# Patient Record
Sex: MALE
[De-identification: names, ages, dates, MRNs, and addresses within clinical notes are randomized per-mention and may not be internally consistent; named-entity substitution may affect disease eponyms.]

---

## 2022-01-10 ENCOUNTER — APPOINTMENT (OUTPATIENT)
Dept: HEART AND VASCULAR | Facility: CLINIC | Age: 67
End: 2022-01-10
Payer: MEDICARE

## 2022-01-10 VITALS — HEART RATE: 91 BPM | DIASTOLIC BLOOD PRESSURE: 86 MMHG | SYSTOLIC BLOOD PRESSURE: 130 MMHG

## 2022-01-10 PROBLEM — Z00.00 ENCOUNTER FOR PREVENTIVE HEALTH EXAMINATION: Status: ACTIVE | Noted: 2022-01-10

## 2022-01-10 PROCEDURE — 99204 OFFICE O/P NEW MOD 45 MIN: CPT

## 2022-01-10 NOTE — PHYSICAL EXAM

## 2022-01-10 NOTE — ASSESSMENT
[FreeTextEntry1] : 66-year-old man with past medical history of morbid obesity,  hypertension, hyperlipidemia, diabetes here for first evaluation with this provider\par \par BECK\par -echo\par -nuc stress test \par \par HTN\par -Well-controlled \par -Continue with telmisartan hydrochlorothiazide carefully monitor renal function (creatinine 1.7, baseline?)\par -echocardiogram\par \par HLD\par LDL 60 on 12/13/2021\par -cont with atorvastatin 20 mg daily\par \par DIZZINESS\par -echo, carotids as above\par \par Follow-up in 2 weeks for echo, carotids and the nuclear stress test results\par \par

## 2022-01-10 NOTE — HISTORY OF PRESENT ILLNESS
[FreeTextEntry1] : 66-year-old man with past medical history of morbid obesity,  hypertension, hyperlipidemia, diabetes here for first evaluation with this provider\par Patient reports episodes of dizziness described as room spinning only when sitting\par Reports BECK after few blocks, can walk 2-3 blocks\par Denies , palpitations, syncope, presyncope, LE edema, orthopnea. \par \par \par PMH \par Hypertension\par Hyperlipidemia\par Diabetes\par Asthma\par \par \par PSH\par colon sgy\par \par ALL\par NKDA\par \par MEDS\par Telmisartan–hydrochlorothiazide (80-25) mg daily\par Atorvastatin 20 mg daily\par \par SH\par no smoke, no etoh, no drugs\par \par EKG 12/9/2021 Sinus rhythm, T wave inversions lateral leads\par \par \par Carotids duplex at 12/8/2020\par No hemodynamically significant carotid stenosis\par \par \par Echocardiogram 12/8/2020\par Normal left ventricular size and function\par Normal right ventricular size and function\par Mildly dilated ascending aorta (4.0 cm)\par \par Labs 12/13/2021\par Cholesterol 128\par HDL 49\par Triglycerides 96\par LDL 60\par creatinine 1.6\par

## 2022-01-14 DIAGNOSIS — R42 DIZZINESS AND GIDDINESS: ICD-10-CM

## 2022-01-14 DIAGNOSIS — E78.5 HYPERLIPIDEMIA, UNSPECIFIED: ICD-10-CM

## 2022-01-14 DIAGNOSIS — R06.02 SHORTNESS OF BREATH: ICD-10-CM

## 2022-01-14 DIAGNOSIS — I10 ESSENTIAL (PRIMARY) HYPERTENSION: ICD-10-CM

## 2022-01-31 ENCOUNTER — APPOINTMENT (OUTPATIENT)
Dept: HEART AND VASCULAR | Facility: CLINIC | Age: 67
End: 2022-01-31
Payer: MEDICARE

## 2022-01-31 PROCEDURE — 99442: CPT

## 2023-05-01 ENCOUNTER — OFFICE (OUTPATIENT)
Dept: URBAN - METROPOLITAN AREA CLINIC 30 | Facility: CLINIC | Age: 68
Setting detail: OPHTHALMOLOGY
End: 2023-05-01
Payer: MEDICARE

## 2023-05-01 DIAGNOSIS — H40.003: ICD-10-CM

## 2023-05-01 DIAGNOSIS — E11.9: ICD-10-CM

## 2023-05-01 DIAGNOSIS — H47.233: ICD-10-CM

## 2023-05-01 PROCEDURE — 92004 COMPRE OPH EXAM NEW PT 1/>: CPT | Performed by: OPHTHALMOLOGY

## 2023-05-01 PROCEDURE — 92083 EXTENDED VISUAL FIELD XM: CPT | Performed by: OPHTHALMOLOGY

## 2023-05-01 PROCEDURE — 92250 FUNDUS PHOTOGRAPHY W/I&R: CPT | Performed by: OPHTHALMOLOGY

## 2023-05-01 ASSESSMENT — PACHYMETRY
OD_CT_CORRECTION: -5
OS_CT_CORRECTION: -4

## 2023-05-01 ASSESSMENT — REFRACTION_AUTOREFRACTION
OD_SPHERE: -0.25
OD_AXIS: 150
OS_SPHERE: -0.75
OD_CYLINDER: +0.50
OS_CYLINDER: +1.00
OS_AXIS: 5

## 2023-05-01 ASSESSMENT — REFRACTION_MANIFEST
OD_VA1: 20/30+2
OS_VA1: 20/30
OS_AXIS: 5
OD_AXIS: 150
OD_SPHERE: -0.25
OD_CYLINDER: +0.50
OS_CYLINDER: +1.00
OS_SPHERE: -0.75

## 2023-05-01 ASSESSMENT — CONFRONTATIONAL VISUAL FIELD TEST (CVF)
OD_FINDINGS: FULL
OS_FINDINGS: FULL

## 2023-05-01 ASSESSMENT — SPHEQUIV_DERIVED
OD_SPHEQUIV: 0
OS_SPHEQUIV: -0.25
OD_SPHEQUIV: 0
OS_SPHEQUIV: -0.25

## 2023-05-01 ASSESSMENT — TONOMETRY
OS_IOP_MMHG: 14
OD_IOP_MMHG: 14

## 2023-05-01 ASSESSMENT — VISUAL ACUITY
OD_BCVA: 20/50
OS_BCVA: 20/60-1

## 2023-07-13 ENCOUNTER — OFFICE (OUTPATIENT)
Dept: URBAN - METROPOLITAN AREA CLINIC 30 | Facility: CLINIC | Age: 68
Setting detail: OPHTHALMOLOGY
End: 2023-07-13
Payer: MEDICARE

## 2023-07-13 DIAGNOSIS — E11.9: ICD-10-CM

## 2023-07-13 DIAGNOSIS — H16.223: ICD-10-CM

## 2023-07-13 DIAGNOSIS — H47.233: ICD-10-CM

## 2023-07-13 DIAGNOSIS — H25.093: ICD-10-CM

## 2023-07-13 DIAGNOSIS — H40.003: ICD-10-CM

## 2023-07-13 PROCEDURE — 92014 COMPRE OPH EXAM EST PT 1/>: CPT | Performed by: OPHTHALMOLOGY

## 2023-07-13 PROCEDURE — 92133 CPTRZD OPH DX IMG PST SGM ON: CPT | Performed by: OPHTHALMOLOGY

## 2023-07-13 PROCEDURE — 76514 ECHO EXAM OF EYE THICKNESS: CPT | Performed by: OPHTHALMOLOGY

## 2023-07-13 PROCEDURE — 92020 GONIOSCOPY: CPT | Performed by: OPHTHALMOLOGY

## 2023-07-13 PROCEDURE — 92083 EXTENDED VISUAL FIELD XM: CPT | Performed by: OPHTHALMOLOGY

## 2023-07-13 ASSESSMENT — REFRACTION_AUTOREFRACTION
OD_AXIS: 150
OD_CYLINDER: +0.50
OS_CYLINDER: +1.00
OD_SPHERE: -0.25
OS_SPHERE: -0.75
OS_AXIS: 5

## 2023-07-13 ASSESSMENT — REFRACTION_MANIFEST
OD_AXIS: 150
OS_VA1: 20/30
OS_CYLINDER: +1.00
OD_VA1: 20/30+2
OS_AXIS: 5
OD_CYLINDER: +0.50
OD_SPHERE: -0.25
OS_SPHERE: -0.75

## 2023-07-13 ASSESSMENT — SUPERFICIAL PUNCTATE KERATITIS (SPK)
OD_SPK: 1+ 2+
OS_SPK: 1+ 2+

## 2023-07-13 ASSESSMENT — PACHYMETRY
OS_CT_CORRECTION: 2
OS_CT_UM: 518
OD_CT_UM: 509
OD_CT_CORRECTION: 3

## 2023-07-13 ASSESSMENT — SPHEQUIV_DERIVED
OD_SPHEQUIV: 0
OD_SPHEQUIV: 0
OS_SPHEQUIV: -0.25
OS_SPHEQUIV: -0.25

## 2023-07-13 ASSESSMENT — TONOMETRY
OS_IOP_MMHG: 16
OD_IOP_MMHG: 17

## 2023-07-13 ASSESSMENT — VISUAL ACUITY
OS_BCVA: 20/25-2
OD_BCVA: 20/25-2

## 2023-11-30 ENCOUNTER — OFFICE (OUTPATIENT)
Dept: URBAN - METROPOLITAN AREA CLINIC 30 | Facility: CLINIC | Age: 68
Setting detail: OPHTHALMOLOGY
End: 2023-11-30
Payer: MEDICARE

## 2023-11-30 DIAGNOSIS — H40.003: ICD-10-CM

## 2023-11-30 DIAGNOSIS — H25.093: ICD-10-CM

## 2023-11-30 DIAGNOSIS — H16.223: ICD-10-CM

## 2023-11-30 PROCEDURE — 92083 EXTENDED VISUAL FIELD XM: CPT | Performed by: OPHTHALMOLOGY

## 2023-11-30 PROCEDURE — 99213 OFFICE O/P EST LOW 20 MIN: CPT | Performed by: OPHTHALMOLOGY

## 2023-11-30 ASSESSMENT — SPHEQUIV_DERIVED
OD_SPHEQUIV: 0
OS_SPHEQUIV: -0.25
OS_SPHEQUIV: -0.25
OD_SPHEQUIV: 0

## 2023-11-30 ASSESSMENT — REFRACTION_MANIFEST
OD_SPHERE: -0.25
OS_VA1: 20/30
OS_AXIS: 5
OD_AXIS: 150
OD_VA1: 20/30+2
OS_SPHERE: -0.75
OS_CYLINDER: +1.00
OD_CYLINDER: +0.50

## 2023-11-30 ASSESSMENT — REFRACTION_AUTOREFRACTION
OD_CYLINDER: +0.50
OD_AXIS: 150
OS_SPHERE: -0.75
OD_SPHERE: -0.25
OS_CYLINDER: +1.00
OS_AXIS: 5

## 2023-11-30 ASSESSMENT — CONFRONTATIONAL VISUAL FIELD TEST (CVF)
OD_FINDINGS: FULL
OS_FINDINGS: FULL

## 2023-11-30 ASSESSMENT — SUPERFICIAL PUNCTATE KERATITIS (SPK)
OD_SPK: 1+ 2+
OS_SPK: 1+ 2+

## 2024-07-25 ENCOUNTER — OFFICE (OUTPATIENT)
Dept: URBAN - METROPOLITAN AREA CLINIC 30 | Facility: CLINIC | Age: 69
Setting detail: OPHTHALMOLOGY
End: 2024-07-25
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
## 2024-07-25 PROCEDURE — NO SHOW FE NO SHOW FEE: Performed by: OPHTHALMOLOGY

## 2024-10-24 ENCOUNTER — OFFICE (OUTPATIENT)
Facility: LOCATION | Age: 69
Setting detail: OPHTHALMOLOGY
End: 2024-10-24
Payer: MEDICARE

## 2024-10-24 DIAGNOSIS — H16.223: ICD-10-CM

## 2024-10-24 DIAGNOSIS — E11.9: ICD-10-CM

## 2024-10-24 DIAGNOSIS — E11.3293: ICD-10-CM

## 2024-10-24 DIAGNOSIS — H25.13: ICD-10-CM

## 2024-10-24 DIAGNOSIS — H40.003: ICD-10-CM

## 2024-10-24 DIAGNOSIS — H25.093: ICD-10-CM

## 2024-10-24 PROCEDURE — 92083 EXTENDED VISUAL FIELD XM: CPT | Performed by: OPHTHALMOLOGY

## 2024-10-24 PROCEDURE — 92020 GONIOSCOPY: CPT | Performed by: OPHTHALMOLOGY

## 2024-10-24 PROCEDURE — 92014 COMPRE OPH EXAM EST PT 1/>: CPT | Performed by: OPHTHALMOLOGY

## 2024-10-24 PROCEDURE — 92133 CPTRZD OPH DX IMG PST SGM ON: CPT | Performed by: OPHTHALMOLOGY

## 2024-10-24 ASSESSMENT — REFRACTION_MANIFEST
OD_VA1: 20/30+2
OS_CYLINDER: +1.00
OS_AXIS: 5
OD_AXIS: 150
OD_CYLINDER: +0.50
OD_SPHERE: -0.25
OS_VA1: 20/30
OS_SPHERE: -0.75

## 2024-10-24 ASSESSMENT — VISUAL ACUITY
OS_BCVA: 20/25-2
OD_BCVA: 20/30-2

## 2024-10-24 ASSESSMENT — REFRACTION_AUTOREFRACTION
OS_SPHERE: -0.75
OD_AXIS: 150
OS_CYLINDER: +1.00
OD_SPHERE: -0.25
OD_CYLINDER: +0.50
OS_AXIS: 5

## 2024-10-24 ASSESSMENT — PACHYMETRY
OD_CT_UM: 509
OS_CT_CORRECTION: 2
OS_CT_UM: 518
OD_CT_CORRECTION: 3

## 2024-10-24 ASSESSMENT — TONOMETRY
OD_IOP_MMHG: 16
OS_IOP_MMHG: 16

## 2024-10-24 ASSESSMENT — SUPERFICIAL PUNCTATE KERATITIS (SPK)
OD_SPK: 2+
OS_SPK: 2+

## 2024-10-24 ASSESSMENT — CONFRONTATIONAL VISUAL FIELD TEST (CVF)
OS_FINDINGS: FULL
OD_FINDINGS: FULL

## 2025-04-24 ENCOUNTER — OFFICE (OUTPATIENT)
Facility: LOCATION | Age: 70
Setting detail: OPHTHALMOLOGY
End: 2025-04-24
Payer: MEDICARE

## 2025-04-24 DIAGNOSIS — H40.003: ICD-10-CM

## 2025-04-24 DIAGNOSIS — H25.13: ICD-10-CM

## 2025-04-24 DIAGNOSIS — H16.223: ICD-10-CM

## 2025-04-24 DIAGNOSIS — H25.093: ICD-10-CM

## 2025-04-24 PROCEDURE — 99213 OFFICE O/P EST LOW 20 MIN: CPT | Performed by: OPHTHALMOLOGY

## 2025-04-24 PROCEDURE — 92083 EXTENDED VISUAL FIELD XM: CPT | Performed by: OPHTHALMOLOGY

## 2025-04-24 PROCEDURE — 92133 CPTRZD OPH DX IMG PST SGM ON: CPT | Performed by: OPHTHALMOLOGY

## 2025-04-24 ASSESSMENT — CONFRONTATIONAL VISUAL FIELD TEST (CVF)
OD_FINDINGS: FULL
OS_FINDINGS: FULL

## 2025-04-24 ASSESSMENT — VISUAL ACUITY
OD_BCVA: 20/50-1
OS_BCVA: 20/25-1

## 2025-04-24 ASSESSMENT — REFRACTION_AUTOREFRACTION
OD_SPHERE: -0.25
OS_CYLINDER: +1.00
OS_AXIS: 5
OD_AXIS: 150
OS_SPHERE: -0.75
OD_CYLINDER: +0.50

## 2025-04-24 ASSESSMENT — REFRACTION_MANIFEST
OS_AXIS: 5
OS_CYLINDER: +1.00
OD_CYLINDER: +0.50
OS_SPHERE: -0.75
OD_AXIS: 150
OD_SPHERE: -0.25
OD_VA1: 20/30+2
OS_VA1: 20/30

## 2025-04-24 ASSESSMENT — PACHYMETRY
OS_CT_UM: 518
OD_CT_CORRECTION: 3
OS_CT_CORRECTION: 2
OD_CT_UM: 509

## 2025-04-24 ASSESSMENT — SUPERFICIAL PUNCTATE KERATITIS (SPK)
OD_SPK: 2+
OS_SPK: 2+